# Patient Record
Sex: MALE | Race: WHITE | Employment: UNEMPLOYED | URBAN - METROPOLITAN AREA
[De-identification: names, ages, dates, MRNs, and addresses within clinical notes are randomized per-mention and may not be internally consistent; named-entity substitution may affect disease eponyms.]

---

## 2017-12-21 ENCOUNTER — TELEPHONE (OUTPATIENT)
Dept: ORTHOPEDIC SURGERY | Age: 52
End: 2017-12-21

## 2017-12-21 NOTE — TELEPHONE ENCOUNTER
Referred by Dr. Demetri Regalado for cervical degenerative disc disease and neck pain. [facility]. Notes and report under Media. Pt has paramount advantage for insurance. Will you see?

## 2018-03-15 ENCOUNTER — OFFICE VISIT (OUTPATIENT)
Dept: ORTHOPEDIC SURGERY | Age: 53
End: 2018-03-15
Payer: MEDICARE

## 2018-03-15 VITALS — WEIGHT: 215 LBS | HEIGHT: 69 IN | BODY MASS INDEX: 31.84 KG/M2

## 2018-03-15 DIAGNOSIS — M48.02 SPINAL STENOSIS IN CERVICAL REGION: ICD-10-CM

## 2018-03-15 DIAGNOSIS — M54.2 NECK PAIN: Primary | ICD-10-CM

## 2018-03-15 DIAGNOSIS — M50.30 DDD (DEGENERATIVE DISC DISEASE), CERVICAL: ICD-10-CM

## 2018-03-15 PROCEDURE — 99203 OFFICE O/P NEW LOW 30 MIN: CPT | Performed by: ORTHOPAEDIC SURGERY

## 2018-03-15 PROCEDURE — G8419 CALC BMI OUT NRM PARAM NOF/U: HCPCS | Performed by: ORTHOPAEDIC SURGERY

## 2018-03-15 PROCEDURE — 3017F COLORECTAL CA SCREEN DOC REV: CPT | Performed by: ORTHOPAEDIC SURGERY

## 2018-03-15 PROCEDURE — 4004F PT TOBACCO SCREEN RCVD TLK: CPT | Performed by: ORTHOPAEDIC SURGERY

## 2018-03-15 PROCEDURE — G8427 DOCREV CUR MEDS BY ELIG CLIN: HCPCS | Performed by: ORTHOPAEDIC SURGERY

## 2018-03-15 PROCEDURE — G8484 FLU IMMUNIZE NO ADMIN: HCPCS | Performed by: ORTHOPAEDIC SURGERY

## 2018-03-15 RX ORDER — CYCLOBENZAPRINE HCL 10 MG
10 TABLET ORAL 3 TIMES DAILY PRN
Qty: 90 TABLET | Refills: 0 | Status: SHIPPED | OUTPATIENT
Start: 2018-03-15 | End: 2018-03-25

## 2018-03-15 NOTE — PROGRESS NOTES
Subjective:      Patient ID: Tyron Nunez is a 52 y.o. male. Neck Pain    This is a chronic problem. The current episode started more than 1 year ago. The problem occurs constantly. The problem has been unchanged. The pain is associated with nothing. The pain is present in the midline, left side and right side. The quality of the pain is described as aching. The pain is moderate. The symptoms are aggravated by twisting and position. The pain is same all the time. Stiffness is present all day. He has tried NSAIDs for the symptoms. The treatment provided mild relief. Review of Systems   Musculoskeletal: Positive for neck pain. All other systems reviewed and are negative. Objective:   Physical Exam   Constitutional: He is oriented to person, place, and time. He appears well-developed and well-nourished. HENT:   Head: Normocephalic and atraumatic. Eyes: Conjunctivae and EOM are normal.   Neck: Normal range of motion. Pulmonary/Chest: Effort normal. No respiratory distress. Neurological: He is alert and oriented to person, place, and time. He has normal strength. No sensory deficit. Normal gait   Skin: Skin is warm and dry. Psychiatric: His behavior is normal. Thought content normal.   Nursing note and vitals reviewed. Patient with diminished range of motion rotation and extension. Patient with aggravation pain with neck extension and rotation. MRI cervical spine is reviewed patient with a moderate cervical stenosis C5 6 and C6 7 with a mild anterolisthesis C4 5    Diagnostic x-rays cervical spine reveal a mild anterolisthesis C4 5 with significant cervical degenerative disc disease C5 6 and C6 7    Assessment:      Encounter Diagnoses   Name Primary?     Neck pain Yes    DDD (degenerative disc disease), cervical     Spinal stenosis in cervical region      Patient with moderate cervical stenosis C5 6 and C6 7 that is a likely pain generator    Nevertheless patient's pain is predominantly axial      Plan:      Trial cervical epidural steroid injections    Flexeril    Follow-up

## 2018-03-26 ENCOUNTER — HOSPITAL ENCOUNTER (OUTPATIENT)
Dept: PAIN MANAGEMENT | Age: 53
Discharge: HOME | End: 2018-03-26
Payer: MEDICARE

## 2018-03-26 VITALS
DIASTOLIC BLOOD PRESSURE: 95 MMHG | HEART RATE: 69 BPM | WEIGHT: 215 LBS | RESPIRATION RATE: 16 BRPM | HEIGHT: 69 IN | TEMPERATURE: 97.7 F | OXYGEN SATURATION: 97 % | BODY MASS INDEX: 31.84 KG/M2 | SYSTOLIC BLOOD PRESSURE: 146 MMHG

## 2018-03-26 PROCEDURE — 99241 PR OFFICE CONSULTATION NEW/ESTAB PATIENT 15 MIN: CPT | Performed by: ANESTHESIOLOGY

## 2018-03-26 PROCEDURE — 99203 OFFICE O/P NEW LOW 30 MIN: CPT

## 2018-03-26 RX ORDER — IBUPROFEN 800 MG/1
800 TABLET ORAL
COMMUNITY
Start: 2018-01-16

## 2018-03-26 RX ORDER — CYCLOBENZAPRINE HCL 10 MG
10 TABLET ORAL 3 TIMES DAILY PRN
COMMUNITY
End: 2021-04-13 | Stop reason: ALTCHOICE

## 2018-03-26 ASSESSMENT — PAIN SCALES - GENERAL: PAINLEVEL_OUTOF10: 6

## 2018-03-26 ASSESSMENT — PAIN DESCRIPTION - PROGRESSION: CLINICAL_PROGRESSION: GRADUALLY WORSENING

## 2018-03-26 ASSESSMENT — PAIN DESCRIPTION - ONSET: ONSET: ON-GOING

## 2018-03-26 ASSESSMENT — PAIN DESCRIPTION - FREQUENCY: FREQUENCY: CONTINUOUS

## 2018-03-26 ASSESSMENT — PAIN DESCRIPTION - ORIENTATION: ORIENTATION: RIGHT;LEFT

## 2018-03-26 ASSESSMENT — PAIN DESCRIPTION - LOCATION: LOCATION: NECK

## 2018-03-26 ASSESSMENT — PAIN DESCRIPTION - DESCRIPTORS: DESCRIPTORS: ACHING;TINGLING;SHARP

## 2018-03-26 ASSESSMENT — PAIN DESCRIPTION - PAIN TYPE: TYPE: CHRONIC PAIN

## 2018-03-27 ASSESSMENT — ENCOUNTER SYMPTOMS
SORE THROAT: 0
SHORTNESS OF BREATH: 0
BACK PAIN: 1

## 2018-03-27 NOTE — PROGRESS NOTES
[facility] Pain [facility]  Patient Pain Assessment  Consultation - No att. providers found    Primary Care Physician: Lisbeth Haddad MD    Chief complaint:   Chief Complaint   Patient presents with    Neck Pain   . HISTORY OF PRESENT ILLNESS:  Bran Jiang is 52 y.o. male with    Neck Pain    Associated symptoms include tingling. Pertinent negatives include no chest pain or fever. OARRS compliant? not applicable  Concern for prescription abuse?not applicable    Current Pain Assessment  Pain Assessment  Pain Assessment: 0-10  Pain Level: 6  Pain Type: Chronic pain  Pain Location: Neck  Pain Orientation: Right, Left  Pain Radiating Towards: neck to low back, occasional down left arm and back of head  Pain Descriptors: Aching, Tingling, Sharp  Pain Frequency: Continuous  Pain Onset: On-going  Clinical Progression: Gradually worsening  Effect of Pain on Daily Activities:  head movement increase pain, lying down   Patient's Stated Pain Goal:  (pain at 2 with head movement)  Pain Intervention(s): Medication (see eMar), Heat applied (hot shower)                    ADVERSE MEDICATION EFFECTS:   Constipation: not applicable  Bowel Regimen: Na  Diet: common adult  Appetite:  ok  Sedation:  not applicable  Urinary Retention: not applicable    FOCUSED PAIN SCALE:  Highest : 10  Lowest :6  Average: Range-6  When and What  was your last procedure:  na    Was your procedure effective:  not applicable    ACTIVITY/SOCIAL/EMOTIONAL:  Sleep Pattern: 5 hours per night.  generally restful sleep  Energy Level:  Normal  Currently attending Physical Therapy:  No  Home Exercises: never none  Mobility: no current mobility problem   Currently seeing a Psychiatrist or Psychologist:  No  Emotional Issues: moodiness   Mood: appropriate     ABERRANT BEHAVIORS SINCE LAST VISIT:  Have you ever been treated in another Pain Clinic no  Refills for prescriptions appropriate: not applicable  Lost rx/pills: not applicable  Taking more medication than prescribed:  not applicable  Are you receiving PAIN medications from  other doctors: no  Last Urine/Serum Drug Screen :  Was Serum/UDS as anticipated?  not applicable  Brought pill bottles in :not applicable   Was Pill count appropriate? :not applicable   Are currently pregnant? not applicable  Recent ER visits: No             Past Medical History      Diagnosis Date    Cancer ([facility] [address]     Diverticulosis of colon     GERD (gastroesophageal reflux disease)     Hypertension        Surgical History  Past Surgical History:   Procedure Laterality Date    COLONOSCOPY  12/19/14    hyperplastic polyp, diverticulosis     ENDOSCOPY, COLON, DIAGNOSTIC      SKIN CANCER EXCISION      melanoma    UPPER GASTROINTESTINAL ENDOSCOPY  12/19/14    mild esophagitis       Medications  Current Outpatient Prescriptions   Medication Sig Dispense Refill    ibuprofen (ADVIL;MOTRIN) 800 MG tablet Take 800 mg by mouth      cyclobenzaprine (FLEXERIL) 10 MG tablet Take 10 mg by mouth 3 times daily as needed for Muscle spasms      albuterol (PROVENTIL HFA) 108 (90 BASE) MCG/ACT inhaler Inhale 1-2 puffs into the lungs every 4 hours as needed for Wheezing or Shortness of Breath (Space out to every 6 hours as symptoms improve) Space out to every 6 hours as symptoms improve. 1 Inhaler 0    hydrochlorothiazide (MICROZIDE) 12.5 MG capsule Take 12.5 mg by mouth daily.  omeprazole (PRILOSEC) 40 MG capsule Take 40 mg by mouth daily. No current facility-administered medications for this encounter. Allergies  Patient has no known allergies. Family History  family history includes Diabetes in his father, maternal grandfather, maternal grandmother, and maternal uncle; High Blood Pressure in his mother.     Social History  Social History     Social History    Marital status: Single     Spouse name: N/A    Number of children: N/A    Years of education: N/A     Social History Main Topics   

## 2018-03-27 NOTE — CONSULTS
recent or acute  cardiopulmonary problems except he was in the hospital two months ago with  flu for five days. Negative for diabetes, negative for stroke, negative  for any other endocrine problems, negative for coronary artery disease,  negative for MI. PAST SURGICAL HISTORY:  Positive for excision of the skin cancer many years  ago. PERSONAL HISTORY:  The patient is . He is unemployed at the present  time. 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 He stopped smoking on 01/28/2018 when he was  hospitalized. He denies any alcohol abuse. Denies any illegal substance  abuse except he smokes marijuana occasionally to ease the pain. There is  no history of any weight gain or weight loss recently. No history of  depression. Appetite is good. Sleep is better once he started taking  Flexeril. FAMILY HISTORY:  Noncontributory. ALLERGIES AND MEDICATIONS:  Listed in Epic system. PHYSICAL EXAMINATION:  VITAL SIGNS:  Blood pressure is 146/95, respirations 16, and pulse is 69. He is 5 feet 9 inches tall and weighs about 215 pounds. GENERAL:  On physical examination which was done in the presence of his  wife, the patient is a 52-year-old gentleman, does not appear to be in any  acute distress. He was awake, alert, and oriented, communicative and is  cooperative. He has got a mild-to-moderate exogenous obesity. HEENT:  Head is normocephalic. Pupils are equal and reactive. No nasal  discharge. No discharge from the ears. Oral cavity within normal limits. NECK:  Fredirick Calk is midline. No carotid bruit. No lymph node enlargement. No  thyroid gland enlargement. MUSCULOSKELETAL:  C-spine range of motion is decreased by about 35%  anteroposteriorly and at least 40% from side to side movements and lateral  twisting more so on the right side. His shoulder range of motion is  bilaterally excellent.   His elbow range of motion is fairly good and equal.  Motor strength appears to be 5/5 on both sides. No evidence of any muscle  atrophy. Deep tendon reflexes are 2+ at the biceps, 2+ at the triceps, and  1+ at the brachioradialis and equal on both sides. There are no sensory  deficits for light touch, pinprick or cold sensation on each side. There  is no small muscle atrophy. He got external bilateral hand . There  are no skin changes. There is no hair loss. LUNGS:  Clear except a little bit decreased in the posterior bases. HEART:  S1 and S2 are heard. ABDOMEN:  Pendulous, soft, and nontender. EXTREMITIES:  Lower extremity examination is within normal limits. I reviewed his MRI of the cervical spine, which was done about 6 months ago  showing foraminal stenosis at C5-C6 and at C6-C7 along with the cervical  spine degenerative disk disease. PLAN:  At this point as Dr. Yazmin Gil suggested, we can give him a trial of  cervical epidural steroid injection at C5-C6 and C6-C7 to ease up his pain. The procedure was explained to the patient along with risks, benefits, and  complications using the skeletal model. He and his wife had many  questions, which were answered to their satisfaction. They agreed to  proceed and make an appointment in the near future. Dr. Yazmin Gil, thank you again for your kind referral.  If you have any  questions, please feel free to give us a call in the Pain Clinic.         Carmenza PEARCE    D: 03/26/2018 13:49:05       T: 03/26/2018 22:23:46     [email or web address]  Job#: [id number]     Doc#: [id number]    CC:  Manda Samuel

## 2018-04-02 ENCOUNTER — HOSPITAL ENCOUNTER (OUTPATIENT)
Dept: GENERAL RADIOLOGY | Age: 53
Discharge: HOME | End: 2018-04-04
Payer: MEDICARE

## 2018-04-02 ENCOUNTER — HOSPITAL ENCOUNTER (OUTPATIENT)
Dept: PAIN MANAGEMENT | Age: 53
Discharge: HOME | End: 2018-04-02
Payer: MEDICARE

## 2018-04-02 VITALS
SYSTOLIC BLOOD PRESSURE: 156 MMHG | RESPIRATION RATE: 16 BRPM | TEMPERATURE: 98 F | BODY MASS INDEX: 31.84 KG/M2 | WEIGHT: 215 LBS | HEART RATE: 77 BPM | HEIGHT: 69 IN | DIASTOLIC BLOOD PRESSURE: 100 MMHG | OXYGEN SATURATION: 98 %

## 2018-04-02 DIAGNOSIS — M48.02 CERVICAL STENOSIS OF SPINE: ICD-10-CM

## 2018-04-02 DIAGNOSIS — M54.12 CERVICAL RADICULOPATHY: ICD-10-CM

## 2018-04-02 DIAGNOSIS — M50.30 DDD (DEGENERATIVE DISC DISEASE), CERVICAL: ICD-10-CM

## 2018-04-02 DIAGNOSIS — M54.12 CERVICAL RADICULOPATHY: Primary | ICD-10-CM

## 2018-04-02 PROCEDURE — 62321 NJX INTERLAMINAR CRV/THRC: CPT | Performed by: PAIN MEDICINE

## 2018-04-02 PROCEDURE — 6360000002 HC RX W HCPCS: Performed by: PAIN MEDICINE

## 2018-04-02 PROCEDURE — 2580000003 HC RX 258: Performed by: PAIN MEDICINE

## 2018-04-02 PROCEDURE — 3209999900 FLUORO FOR SURGICAL PROCEDURES

## 2018-04-02 PROCEDURE — 6360000004 HC RX CONTRAST MEDICATION

## 2018-04-02 PROCEDURE — 6360000002 HC RX W HCPCS

## 2018-04-02 PROCEDURE — 62325 NJX INTERLAMINAR CRV/THRC: CPT

## 2018-04-02 RX ORDER — SODIUM CHLORIDE, SODIUM LACTATE, POTASSIUM CHLORIDE, CALCIUM CHLORIDE 600; 310; 30; 20 MG/100ML; MG/100ML; MG/100ML; MG/100ML
75 INJECTION, SOLUTION INTRAVENOUS CONTINUOUS
Status: DISCONTINUED | OUTPATIENT
Start: 2018-04-02 | End: 2018-04-03 | Stop reason: HOSPADM

## 2018-04-02 RX ORDER — MIDAZOLAM HYDROCHLORIDE 1 MG/ML
INJECTION INTRAMUSCULAR; INTRAVENOUS
Status: COMPLETED | OUTPATIENT
Start: 2018-04-02 | End: 2018-04-02

## 2018-04-02 RX ADMIN — MIDAZOLAM 2 MG: 1 INJECTION INTRAMUSCULAR; INTRAVENOUS at 09:16

## 2018-04-02 RX ADMIN — SODIUM CHLORIDE, POTASSIUM CHLORIDE, SODIUM LACTATE AND CALCIUM CHLORIDE 75 ML/HR: 600; 310; 30; 20 INJECTION, SOLUTION INTRAVENOUS at 08:34

## 2018-04-02 ASSESSMENT — PAIN SCALES - GENERAL
PAINLEVEL_OUTOF10: 2
PAINLEVEL_OUTOF10: 8

## 2018-04-02 ASSESSMENT — PAIN DESCRIPTION - ORIENTATION: ORIENTATION: RIGHT;LEFT

## 2018-04-02 ASSESSMENT — PAIN DESCRIPTION - ONSET: ONSET: ON-GOING

## 2018-04-02 ASSESSMENT — PAIN DESCRIPTION - PROGRESSION: CLINICAL_PROGRESSION: GRADUALLY WORSENING

## 2018-04-02 ASSESSMENT — PAIN DESCRIPTION - PAIN TYPE: TYPE: CHRONIC PAIN

## 2018-04-02 ASSESSMENT — PAIN DESCRIPTION - LOCATION: LOCATION: ARM;SHOULDER;NECK

## 2018-04-02 ASSESSMENT — PAIN - FUNCTIONAL ASSESSMENT: PAIN_FUNCTIONAL_ASSESSMENT: 0-10

## 2018-04-03 ENCOUNTER — TELEPHONE (OUTPATIENT)
Dept: PAIN MANAGEMENT | Age: 53
End: 2018-04-03

## 2018-04-23 ENCOUNTER — HOSPITAL ENCOUNTER (OUTPATIENT)
Dept: PAIN MANAGEMENT | Age: 53
Discharge: HOME | End: 2018-04-23
Payer: MEDICARE

## 2018-04-23 VITALS
DIASTOLIC BLOOD PRESSURE: 101 MMHG | TEMPERATURE: 97.6 F | SYSTOLIC BLOOD PRESSURE: 140 MMHG | OXYGEN SATURATION: 96 % | HEIGHT: 69 IN | HEART RATE: 61 BPM | RESPIRATION RATE: 16 BRPM | BODY MASS INDEX: 31.84 KG/M2 | WEIGHT: 215 LBS

## 2018-04-23 DIAGNOSIS — M50.30 DDD (DEGENERATIVE DISC DISEASE), CERVICAL: ICD-10-CM

## 2018-04-23 DIAGNOSIS — M48.02 CERVICAL STENOSIS OF SPINE: ICD-10-CM

## 2018-04-23 DIAGNOSIS — M54.12 CERVICAL RADICULOPATHY: Primary | ICD-10-CM

## 2018-04-23 PROCEDURE — 99213 OFFICE O/P EST LOW 20 MIN: CPT

## 2018-04-23 PROCEDURE — 99213 OFFICE O/P EST LOW 20 MIN: CPT | Performed by: PAIN MEDICINE

## 2018-04-23 ASSESSMENT — ENCOUNTER SYMPTOMS
SHORTNESS OF BREATH: 0
VOMITING: 0
HEARTBURN: 0
CONSTIPATION: 0
NAUSEA: 0
ORTHOPNEA: 0
GASTROINTESTINAL NEGATIVE: 1
BLURRED VISION: 0
COUGH: 0
PHOTOPHOBIA: 0
SORE THROAT: 0

## 2018-04-23 ASSESSMENT — PAIN SCALES - GENERAL: PAINLEVEL_OUTOF10: 3

## 2018-04-23 ASSESSMENT — PAIN DESCRIPTION - DESCRIPTORS: DESCRIPTORS: SPASM

## 2018-04-23 ASSESSMENT — PAIN DESCRIPTION - LOCATION: LOCATION: NECK

## 2018-04-23 ASSESSMENT — PAIN DESCRIPTION - PAIN TYPE: TYPE: CHRONIC PAIN

## 2018-04-23 ASSESSMENT — PAIN DESCRIPTION - ONSET: ONSET: ON-GOING

## 2018-04-23 ASSESSMENT — PAIN DESCRIPTION - FREQUENCY: FREQUENCY: CONTINUOUS

## 2018-04-23 ASSESSMENT — PAIN DESCRIPTION - ORIENTATION: ORIENTATION: RIGHT

## 2018-04-23 ASSESSMENT — PAIN DESCRIPTION - PROGRESSION: CLINICAL_PROGRESSION: NOT CHANGED

## 2018-04-23 ASSESSMENT — ACTIVITIES OF DAILY LIVING (ADL): EFFECT OF PAIN ON DAILY ACTIVITIES: PAIN INCREASES WITH HEAD MOVEMENT

## 2021-04-13 ENCOUNTER — TELEPHONE (OUTPATIENT)
Dept: UROLOGY | Age: 56
End: 2021-04-13

## 2021-04-13 ENCOUNTER — OFFICE VISIT (OUTPATIENT)
Dept: UROLOGY | Age: 56
End: 2021-04-13
Payer: MEDICARE

## 2021-04-13 VITALS — DIASTOLIC BLOOD PRESSURE: 81 MMHG | TEMPERATURE: 97.6 F | HEART RATE: 74 BPM | SYSTOLIC BLOOD PRESSURE: 138 MMHG

## 2021-04-13 DIAGNOSIS — R97.20 ELEVATED PSA: Primary | ICD-10-CM

## 2021-04-13 PROCEDURE — G8421 BMI NOT CALCULATED: HCPCS | Performed by: UROLOGY

## 2021-04-13 PROCEDURE — 99203 OFFICE O/P NEW LOW 30 MIN: CPT | Performed by: UROLOGY

## 2021-04-13 PROCEDURE — 3017F COLORECTAL CA SCREEN DOC REV: CPT | Performed by: UROLOGY

## 2021-04-13 PROCEDURE — 1036F TOBACCO NON-USER: CPT | Performed by: UROLOGY

## 2021-04-13 PROCEDURE — G8427 DOCREV CUR MEDS BY ELIG CLIN: HCPCS | Performed by: UROLOGY

## 2021-04-13 ASSESSMENT — ENCOUNTER SYMPTOMS
WHEEZING: 0
BACK PAIN: 0
EYE REDNESS: 0
VOMITING: 0
COUGH: 0
DIARRHEA: 0
ABDOMINAL PAIN: 0
NAUSEA: 0
CONSTIPATION: 0
EYE PAIN: 0
SHORTNESS OF BREATH: 0

## 2021-04-13 NOTE — TELEPHONE ENCOUNTER
Watch out for repeat PSA done at [facility]. If still elevated, will need on the books for a prostate biopsy per Dr. Dejesus Cons.

## 2021-04-13 NOTE — PROGRESS NOTES
Review of Systems   Constitutional: Negative for appetite change, chills and fever. Eyes: Negative for pain, redness and visual disturbance. Respiratory: Negative for cough, shortness of breath and wheezing. Cardiovascular: Negative for chest pain and leg swelling. Gastrointestinal: Negative for abdominal pain, constipation, diarrhea, nausea and vomiting. Genitourinary: Positive for frequency (every so often). Negative for difficulty urinating, dysuria, flank pain, hematuria and urgency. Musculoskeletal: Negative for back pain, joint swelling and myalgias. Skin: Negative for rash and wound. Neurological: Negative for dizziness, tremors and numbness. Hematological: Does not bruise/bleed easily.

## 2021-04-13 NOTE — PROGRESS NOTES
[address] [address]  Dept: [phone]  Dept Fax: [phone] [facility] Urology Office Note - New patient    Patient:  Gregory Rodriguez  YOB: 1965  Date: 4/13/2021    The patient is a 60 y.o. male who presents todayfor evaluation of the following problems:   Chief Complaint   Patient presents with    New Patient    Elevated PSA    referred by Aaron Cadena MD.      HPI  He is here for elevated PSA. His most recent PSA is 5.42. He has not had a previous PSA. No voiding complaints at that time. No FHx of prostate cancer. No history of prostate cancer. (Patient's old records have been requested, reviewed and summarized in today's note.)    Summary of old records: N/A    Additional History: N/A    Procedures Today: N/A    Last several PSA's:  No results found for: PSA  Last total testosterone:  No results found for: TESTOSTERONE  Urinalysis today:  No results found for this visit on 04/13/21. AUA Symptom Score (4/13/2021):   INCOMPLETE EMPTYING: How often have you had the sensation of not emptying your bladder?: Not at all  FREQUENCY: How often do you have to urinate less than every two hours?: Less than Half the time  INTERMITTENCY: How often have you found you stopped and started again several times when you urinated?: Not at all  URGENCY: How often have you found it difficult to postpone urination?: Not at all  WEAK STREAM: How often have you had a weak urinary stream?: Not at all  STRAINING: How often have you had to strain to start  urination?: Not at all  NOCTURIA: How many times did you typically get up at night to uriniate?: 2 Times  TOTAL I-PSS SCORE[de-identified] 4  How would you feel if you were to spend the rest of your life with your urinary condition?: Mostly Satisfied    Last BUN and creatinine:  Lab Results   Component Value Date    BUN 16 06/30/2015     Lab Results   Component Value Date CREATININE 1.10 06/30/2015       Additional Lab/Culture results: none    Imaging Reviewed during this Office Visit: none  (results were independently reviewed by physician and radiology report verified)    PAST MEDICAL, FAMILY AND SOCIAL HISTORY:  Past Medical History:   Diagnosis Date    Cancer ([address]     Diverticulosis of colon     GERD (gastroesophageal reflux disease)     Hypertension      Past Surgical History:   Procedure Laterality Date    COLONOSCOPY  12/19/14    hyperplastic polyp, diverticulosis     ENDOSCOPY, COLON, DIAGNOSTIC      SKIN CANCER EXCISION      melanoma    UPPER GASTROINTESTINAL ENDOSCOPY  12/19/14    mild esophagitis     Family History   Problem Relation Age of Onset    High Blood Pressure Mother     Diabetes Father     Diabetes Maternal Uncle     Diabetes Maternal Grandmother     Diabetes Maternal Grandfather      Outpatient Medications Marked as Taking for the 4/13/21 encounter (Office Visit) with Araceli Alvarez MD   Medication Sig Dispense Refill    LISINOPRIL PO Take by mouth daily      ibuprofen (ADVIL;MOTRIN) 800 MG tablet Take 800 mg by mouth      albuterol (PROVENTIL HFA) 108 (90 BASE) MCG/ACT inhaler Inhale 1-2 puffs into the lungs every 4 hours as needed for Wheezing or Shortness of Breath (Space out to every 6 hours as symptoms improve) Space out to every 6 hours as symptoms improve. 1 Inhaler 0    hydrochlorothiazide (MICROZIDE) 12.5 MG capsule Take 12.5 mg by mouth daily.  omeprazole (PRILOSEC) 40 MG capsule Take 40 mg by mouth daily. Patient has no known allergies.   Social History     Tobacco Use   Smoking Status Former Smoker    Packs/day: 0.00    Years: 30.00    Pack years: 0.00    Quit date: 1/28/2018    Years since quitting: 3.2   Smokeless Tobacco Never Used      (If patient a smoker, smoking cessation counseling offered)   Social History     Substance and Sexual Activity   Alcohol Use No       REVIEW OF SYSTEMS:  Review of Systems Physical Exam:    This a 60 y.o. male   Vitals:    04/13/21 1350   BP: 138/81   Pulse: 74   Temp: 97.6 °F (36.4 °C)     There is no height or weight on file to calculate BMI. Physical Exam  Constitutional: Patient in no acute distress. Neuro: Alert and oriented to person, place and time. Psych: Mood normal, affect normal  Lungs:Respiratory effort is normal  Cardiovascular: Warm & Pink  Abdomen: Soft, non-tender, non-distendedwith no CVA,  No flank tenderness,  Orhepatosplenomegaly   Lymphatics: No palpable lymphadenopathy. Bladder non-tender and not distended. Musculoskeletal: Normal gait and station  Penis normal and circumcised  Urethral meatus normal  Scrotal exam normal  Testicles normal bilaterally  Epididymis normal bilaterally  No evidence of inguinal hernia  Normal rectal tone with no masses  Prostate:  30 grams, smooth, no nodules. Assessment and Plan      1. Elevated PSA           Plan:        Has elevated PSA. Has never had a previous PSA. Will obtain repeat PSA. If remains elevated, would recommend a biopsy. Prescriptions Ordered:  No orders of the defined types were placed in this encounter. Orders Placed:  Orders Placed This Encounter   Procedures    PSA, Diagnostic     Standing Status:   Future     Standing Expiration Date:   4/13/2022             Bola Freeman MD    Agree with the ROS entered by the MA.

## 2021-04-13 NOTE — LETTER
[address] [address]  Dept: [phone]  Dept Fax: [phone]        4/13/21    Patient: Anibal Youssef  YOB: 1965    Dear Rojelio Dinh MD,    I had the pleasure of seeing one of your patients, Sylvester Etienne today in the office today. Below are the relevant portions of my assessment and plan of care. IMPRESSION:  1. Elevated PSA        PLAN:  Has elevated PSA. Has never had a previous PSA. Will obtain repeat PSA. If remains elevated, would recommend a biopsy. Prescriptions Ordered:  No orders of the defined types were placed in this encounter. Orders Placed:  Orders Placed This Encounter   Procedures    PSA, Diagnostic     Standing Status:   Future     Standing Expiration Date:   4/13/2022         Thank you for allowing me to participate in the care of this patient. I will keep you updated on this patient's follow up and I look forward to serving you and your patients again in the future.         Marian Thao MD

## 2021-04-21 NOTE — TELEPHONE ENCOUNTER
Please discussed with Dr. Alva Bright       Lab Results  - documented in this encounter    Prostatic specific antigen screen (04/19/2021 10:05 AM EDT)  Prostatic specific antigen screen (04/19/2021 10:05 AM EDT)   Component Value Ref Range Performed At Pathologist Signature   Prostatic specific antigen, screen 5.17 (H) 0.00 - 4.00 ng/mL [facility]       Prostatic specific antigen screen (04/19/2021 10:05 AM EDT)   Specimen   Serum     Prostatic specific antigen screen (04/19/2021 10:05 AM EDT)   Performing Organization Address City/State/ZIP Code Phone Number   [email or web address]       [facility]   [address] [facility] [address] [facility] [address]

## 2021-05-06 ENCOUNTER — OFFICE VISIT (OUTPATIENT)
Dept: PODIATRY | Age: 56
End: 2021-05-06
Payer: COMMERCIAL

## 2021-05-06 VITALS — HEIGHT: 69 IN | WEIGHT: 217 LBS | BODY MASS INDEX: 32.14 KG/M2

## 2021-05-06 DIAGNOSIS — M21.861 GASTROCNEMIUS EQUINUS OF RIGHT LOWER EXTREMITY: ICD-10-CM

## 2021-05-06 DIAGNOSIS — R60.0 EDEMA OF LOWER EXTREMITY: ICD-10-CM

## 2021-05-06 DIAGNOSIS — M76.61 ACHILLES TENDINITIS OF RIGHT LOWER EXTREMITY: ICD-10-CM

## 2021-05-06 DIAGNOSIS — M89.8X7 RETROCALCANEAL EXOSTOSIS: Primary | ICD-10-CM

## 2021-05-06 DIAGNOSIS — M77.51 CALCANEAL BURSITIS (HEEL), RIGHT: ICD-10-CM

## 2021-05-06 DIAGNOSIS — M79.671 PAIN IN RIGHT FOOT: ICD-10-CM

## 2021-05-06 PROCEDURE — 1036F TOBACCO NON-USER: CPT | Performed by: PODIATRIST

## 2021-05-06 PROCEDURE — G8427 DOCREV CUR MEDS BY ELIG CLIN: HCPCS | Performed by: PODIATRIST

## 2021-05-06 PROCEDURE — 99203 OFFICE O/P NEW LOW 30 MIN: CPT | Performed by: PODIATRIST

## 2021-05-06 PROCEDURE — 3017F COLORECTAL CA SCREEN DOC REV: CPT | Performed by: PODIATRIST

## 2021-05-06 PROCEDURE — G8417 CALC BMI ABV UP PARAM F/U: HCPCS | Performed by: PODIATRIST

## 2021-05-06 RX ORDER — AMITRIPTYLINE HYDROCHLORIDE 10 MG/1
10 TABLET, FILM COATED ORAL NIGHTLY
COMMUNITY
Start: 2021-04-14

## 2021-05-06 RX ORDER — MELOXICAM 7.5 MG/1
7.5 TABLET ORAL DAILY PRN
COMMUNITY
Start: 2021-04-14 | End: 2021-06-17 | Stop reason: ALTCHOICE

## 2021-05-06 ASSESSMENT — ENCOUNTER SYMPTOMS
DIARRHEA: 0
BACK PAIN: 1
COLOR CHANGE: 0
NAUSEA: 0
SHORTNESS OF BREATH: 0

## 2021-05-06 NOTE — PROGRESS NOTES
12/19/14    hyperplastic polyp, diverticulosis     ENDOSCOPY, COLON, DIAGNOSTIC      SKIN CANCER EXCISION      melanoma    UPPER GASTROINTESTINAL ENDOSCOPY  12/19/14    mild esophagitis       Family History   Problem Relation Age of Onset    High Blood Pressure Mother     Diabetes Father     Diabetes Maternal Uncle     Diabetes Maternal Grandmother     Diabetes Maternal Grandfather        Social History     Tobacco Use    Smoking status: Former Smoker     Packs/day: 0.00     Years: 30.00     Pack years: 0.00     Quit date: 1/28/2018     Years since quitting: 3.2    Smokeless tobacco: Never Used   Substance Use Topics    Alcohol use: No       Review of Systems   Constitutional: Negative for activity change, appetite change, chills, diaphoresis, fatigue and fever. Respiratory: Negative for shortness of breath. Cardiovascular: Negative for leg swelling. Gastrointestinal: Negative for diarrhea and nausea. Endocrine: Negative for cold intolerance, heat intolerance and polyuria. Musculoskeletal: Positive for arthralgias and back pain. Negative for gait problem, joint swelling and myalgias. Skin: Negative for color change, pallor, rash and wound. Allergic/Immunologic: Negative for environmental allergies and food allergies. Neurological: Negative for dizziness, weakness, light-headedness and numbness. Hematological: Does not bruise/bleed easily. Psychiatric/Behavioral: Negative for behavioral problems, confusion and self-injury. The patient is not nervous/anxious. Vitals: There were no vitals filed for this visit. General: AAO x 3 in NAD. Integument: There are no rashes, ulcers, or breaks in the skin noted to the bilateral lower extremities. There is no induration, subcutaneous nodules, or tightening of the skin noted to the bilateral.     Toenails 1-5 of the right foot do not present with thickness, elongation, discoloration, brittleness, subungual debris.      Toenails 1-5 of the left foot do not present with thickness, elongation, discoloration, brittleness, subungual debris. Interdigital maceration absent to web spaces 1-4, Bilateral.     There are no preulcerative lesions noted to the right foot. There are no preulcerative lesions noted to the left foot. The skin to the bilateral feet is not thin and shiny. The skin to the bilateral feet is  warm, supple, and dry. Vascular: DP pulse of the right foot is  palpable. DP pulse of the left foot is  palpable. PT pulse of the right foot is  palpable. PT pulse of the left foot is  palpable. CFT is less than 3 secs to the digits of the right foot. CFT is less than 3 secs to the digits of the left foot. There is no edema noted to the bilateral foot or ankle. There is hair growth noted to the digits of the bilateral feet. There are no varicosities noted to the right foot/ankle. There are no varicosities noted to the left foot/ankle. Erythema is absent to the bilateral feet. Neurological: Reflexes are present to the right plantar foot and to the Achilles tendon. Reflexes are present to the left plantar foot and to the Achilles tendon. Epicritic sensation is  intact to the right foot. Epicritic sensation is  intact to the left foot. Protective sensation is present to the right foot along the first and second rays as noted with a 5.07 False Pass-Meal monofilament. Musculoskeletal:  Muscle strength is +5/5 to all four muscle groups of the right lower extremity and +5/5 to all four muscle groups of the left lower extremity. There are no areas of subluxation, dislocation, or laxity noted to either lower extremity. Range of motion to the right ankle is  free of pain or grinding. Range of motion to the left ankle is  free of pain or grinding. Range of motion to the right subtalar joint is  free of pain or grinding.      Range of motion to the left subtalar joint is  free of pain or grinding. There is soft tissue resistance upon passive dorsiflexion of the bilateral ankles with the knee(s) extended. However, this soft tissue resistance is not present with the knee(s) flexed. This finding is greater on the right. There is pain with palpation to the posterior aspect of the right heel at the insertion of the Achilles tendon. There is no pain with palpation to the watershed region of the Achilles tendon. No abnormalities, asymmetries, or misalignments are seen between the extremities. Weightbearing evaluation does reveal rearfoot eversion, medial prominence of the talar head, loss of the medial longitudinal arch height, and too many toes sign bilaterally. The lesser digits of the right foot are not contracted. The lesser digits of the left foot are not contracted. There is no prominence noted to the first metatarsal head without abduction of the hallux of the right foot. There is no prominence noted to the first metatarsal head without abduction of the hallux of the left foot. Shoe examination was performed. Biomechanical Exam: normal bilaterally. X-ray's taken: AP, Lateral, Lateral Oblique, and Medial Oblique of the right foot. Findings: There is no fracture or stress fracture noted. There is a small calcification noted to the posterior aspect of the calcaneus that appears to be within the Achilles tendon. Asessment: Patient is a 60 y.o. male with:    Diagnosis Orders   1. Retrocalcaneal exostosis  XR FOOT RIGHT (MIN 3 VIEWS)    Ambulatory referral to Physical Therapy   2. Calcaneal bursitis (heel), right  XR FOOT RIGHT (MIN 3 VIEWS)    Ambulatory referral to Physical Therapy   3. Achilles tendinitis of right lower extremity  XR FOOT RIGHT (MIN 3 VIEWS)    Ambulatory referral to Physical Therapy   4. Gastrocnemius equinus of right lower extremity  XR FOOT RIGHT (MIN 3 VIEWS)    Ambulatory referral to Physical Therapy   5. Edema of lower extremity  XR FOOT RIGHT (MIN 3 VIEWS)    Ambulatory referral to Physical Therapy   6. Pain in right foot  XR FOOT RIGHT (MIN 3 VIEWS)    Ambulatory referral to Physical Therapy       Plan:  1. Clinical evaluation of the patient. 2. Patient given an order for physical therapy for treatment of his equinus and tendonitis. Patient informed that I find nothing wrong with his foot that relates to the tingling and burning he is experiencing. Patient informed that I believe this is coming from his back and he is advised to see his back specialist for this. 3. Contact office with any questions/problems/concerns. Return in about 6 weeks (around 6/17/2021) for evaluation of achilles right.    5/6/2021      Radha Obrien DPM

## 2021-05-17 ENCOUNTER — HOSPITAL ENCOUNTER (OUTPATIENT)
Dept: PHYSICAL THERAPY | Age: 56
Setting detail: THERAPIES SERIES
Discharge: HOME | End: 2021-05-17
Payer: COMMERCIAL

## 2021-05-17 PROCEDURE — 97162 PT EVAL MOD COMPLEX 30 MIN: CPT

## 2021-05-17 PROCEDURE — 97110 THERAPEUTIC EXERCISES: CPT

## 2021-05-17 NOTE — PROGRESS NOTES
at time of initial PT eval [] MRI:  [] Other:     Medications: [] Refer to full medical record [] None [] Other:  Allergies:      [] Refer to full medical record [] None [] Other:    Function:  Hand Dominance  [] Right  [] Left  Working:  [] Normal Duty  [] Light Duty  [] Off D/T Condition  [] Retired    [] Not Employed    []  Disability  [] Other:           Return to work:   Job/ADL Description: odd jobs- self employed, works on farm and other physical tasks    Patient previously (I) with all functional activity, previously (I) with all functional activity with exceptions listed below.   ADL/IADL Previous level of function Current level of function Who currently assists the patient with task   Bathing  [] Independent  [] Assist [] Independent  [] Assist    Dress/grooming [] Independent  [] Assist [] Independent  [] Assist    Transfer/mobility [] Independent  [] Assist [] Independent  [] Assist    Feeding [] Independent  [] Assist [] Independent  [] Assist    Toileting [] Independent  [] Assist [] Independent  [] Assist    Driving [] Independent  [] Assist [] Independent  [] Assist    Housekeeping [] Independent  [] Assist [x] Independent  [] Assist Difficulty prolonged walking   Grocery shop/meal prep [] Independent  [] Assist [x] Independent  [] Assist Difficulty proglonged walking     Gait Prior level of function Current level of function    [] Independent  [] Assist [x] Independent  [] Assist   Device: [] Independent [] Independent    [] Straight Cane [] Quad cane [] Straight Cane [] Quad cane    [] Standard walker [] Rolling walker   [] 4 wheeled walker [] Standard walker [] Rolling walker   [] 4 wheeled walker    [] Wheelchair [] Wheelchair     Pain:  [x] Yes  [] No Location: (R) posterior calcaneus at juncture of Achilles and calcaneus, worse medially versus laterally Pain Rating: (0-10 scale) 7/10  Pain altered Tx:  [] Yes  [] No  Action:  Symptoms:  [] Improving [] Worsening [] Same  Better:  [] AM    [] PM [] Sit    [] Rise/Sit    []Stand    [] Walk    [] Lying    [] Other:  Worse: [] AM    [] PM    [] Sit    [] Rise/Sit    []Stand    [] Walk    [] Lying    [] Bend                             [] Valsalva    [] Other:  Sleep: [] OK    [] Disturbed    Objective:  AROM:  DF (R) 8 degrees, (L) 10 degrees end range discomfort (R) Achilles  PF 45 degrees no pain (R), (L) 55 degrees  Inversion 35 degrees (B) with clunk with AROM  Eversion 20 degrees (B)    Able to do (B) heel raise with minimal discomfort, SL heel raise to ~50% of uninvolved side with discomfort in Achilles. OBSERVATION No Deficit Deficit Not Tested Comments   Posture       Forward Head [] [] []    Rounded Shoulders [] [] []    Kyphosis [] [] []    Lordosis [] [] []    Lateral Shift [] [] []    Scoliosis [] [] []    Iliac Crest [] [] []    PSIS [] [] []    ASIS [] [] []    Genu Valgus [] [] []    Genu Varus [] [] []    Genu Recurvatum [] [] []    Pronation [] [] []    Supination [] [] []    Leg Length Discrp [] [] []    Slumped Sitting [] [] []    Palpation [] [] [] MOD TTP (R) distal Achilles, proximal calcaneus   Sensation [] [] []    Edema [] [] []    Neurological [] [] []    Patellar Mobility [] [] []    Patellar Orientation [] [] []    Gait [] [] [] Analysis:       Comments:    Assessment:  Patient presents with limited ankle mobility at end range, pain and limitation with full strength testing, limitation with full ambulating first thing in the AM, after sitting in car, and with use for full day. Symptoms are consistent with DX of heel spur and Achilles tendonitis/tendonosis. [] Patient would continue to benefit from skilled physical therapy services in order to: improve/normalize ROM and improve/normalize function. Problems:    [] ? Pain:  Patient pain 7/10 at worst     [] ? ROM:  Limited with end range DF ROM    [] ? Strength:  Limited with SL heel raise    [] ?  Function:   Limited with functional tolerance of WB after sitting, first WB in the AM, with prolonged use throughout the day. STG/LTG: (to be met in 6-8 treatments)  1. ? Pain: normalized pain  2. ? ROM: normalized DF- 10 degrees without pain  3. ? Strength:  Able to do (B) heel raise, able to do SL heel raise without pain  4. ? Function:  Return to WB after sitting and in the AM with 80% or greater improvement of symptoms. 5. Independent with Home Exercise Programs  Patient goals: to be able to walk better    Functional Assessment Used: optimal 50% limited, 9/3  Current Status: Score  Goal Status: Score    Evaluation Complexity:  History (Personal factors, comorbidities) [] 0 [x] 1-2 [] 3+   Exam (limitations, restrictions) [] 1-2 [x] 3 [] 4+   Clinical presentation (progression) [] Stable [x] Evolving  [] Unstable   Decision Making [] Low [x] Moderate [] High    [] Low Complexity [x] Moderate Complexity [] High Complexity       Rehab Potential:  [] Good  [x] Fair  [] Poor   Suggested Professional Referral:  [x] No  [] Yes:  Barriers to Goal Achievement[de-identified]  [x] No  [] Yes:  Domestic Concerns:  [x] No  [] Yes:    Pt. Education:  [x] Plans/Goals, Risks/Benefits discussed  [x] Home exercise program    Method of Education: [x] Verbal  [] Demo  [x] Written  Comprehension of Education:  [] Verbalizes understanding. [] Demonstrates understanding. [] Needs Review. [] Demonstrates/verbalizes understanding of HEP/Ed previously given.     Treatment Plan:  [x] Therapeutic Exercise   52490  [] Iontophoresis: 4 mg/mL Dexamethasone Sodium Phosphate  mAmin  76534   [] Therapeutic Activity  21089 [] Vasopneumatic cold with compression  34616    [] Gait Training   26331 [] Ultrasound   57589   [x] Neuromuscular Re-education  14634 [] Electrical Stimulation Unattended  92778   [x] Manual Therapy  92186 [] Electrical Stimulation Attended  71554   [x] Instruction in HEP  [] Lumbar/Cervical Traction  89204   [] Aquatic Therapy   53564 [] Cold/hotpack    [] Massage   71106      [] Dry Needling, 1 or 2

## 2021-05-19 ENCOUNTER — HOSPITAL ENCOUNTER (OUTPATIENT)
Dept: PHYSICAL THERAPY | Age: 56
Setting detail: THERAPIES SERIES
Discharge: HOME | End: 2021-05-19
Payer: COMMERCIAL

## 2021-05-19 PROCEDURE — 97110 THERAPEUTIC EXERCISES: CPT

## 2021-05-19 NOTE — FLOWSHEET NOTE
[facility]   [address] [facility] [address]   Phone: [phone]   Fax: [phone]    Physical Therapy Daily Treatment Note    Date:  2021  Patient Name:  Orville Mo    :  1965  MRN: [id number]  Physician: Eric Francois DPM                 Insurance: [employer] 30 visits per year  Medical Diagnosis: retrocalcaneal exotosis M89.8X7, (R) heel bursitis M77.51               Rehab Codes: ankle pain M25.571  Onset date: referral 21                     Next Dr's appt.: PRN  Visit Count:                            Cancel/No Show: 0/0     Subjective:  Pt reports feeling good this morning with 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 States less pain noted after stretches and exercises given for at home. Pain:  [x] Yes  [] No Location: Right ankle/heel Pain Rating: (0-10 scale) 3/10  Pain altered Tx:  [x] No  [] Yes  Action:  Comments:    Objective:  Modalities:   Precautions:  Exercises:  Exercise Reps/ Time Weight/ Level Comments Preformed today   Supine Achilles stretch 3 x 20\"    x   Stair stretch 3 x 20\"    x   4-way ankle Tband x10 reps Red R only x   Heel raises 10x  eccentric x   2 up 1 down 10x   x   SLS blue foam 3x30\"  R only x   BAPS board RLE 10x  L2  PF/DF; M/L; Cw/ccw x   Step downs Forward 10x R on step  x                                Other:    Specific Instructions for next treatment:  Progress standing exercises when able and continue POC    Assessment: [x] Progressing toward goals. Tolerated all exercises well with minimal to no pain. States \"feeling a stretch\" with multiple exercises but denies pain. [] No change.      [] Other:    [] Patient would continue to benefit from skilled physical therapy services in order to:     STG/LTG: (to be met in 6-8 treatments)  1. ? Pain: normalized pain  2. ? ROM: normalized DF- 10 degrees without pain  3. ? Strength:  Able to do (B) heel raise, able to do SL heel raise without pain  4. ? Function:  Return to WB after sitting and in the AM with 80% or greater improvement of symptoms. 5. Independent with Home Exercise Programs  Patient goals: to be able to walk better    Pt. Education:  [x] Yes  [] No  [x] Reviewed Prior HEP/Ed  Method of Education: [x] Verbal  [x] Demo  [] Written  Comprehension of Education:  [x] Verbalizes understanding. [x] Demonstrates understanding. [] Needs review. [] Demonstrates/verbalizes HEP/Ed previously given. Plan: [x] Continue per plan of care.    [] Other:      Treatment Charges: Mins Units   []  Modalities     [x]  Ther Exercise 40 3   []  Manual Therapy     []  Ther Activities     []  Aquatics     []  Neuromuscular     [] Vasocompression     [] Gait Training     [] Dry needling        [] 1 or 2 muscles        [] 3 or more muscles     []  Other     Total Treatment time 40 3     Time In: 3873            Time Out: 8038    Electronically signed by:  Radha Martinez PTA

## 2021-05-24 ENCOUNTER — HOSPITAL ENCOUNTER (OUTPATIENT)
Dept: PHYSICAL THERAPY | Age: 56
Setting detail: THERAPIES SERIES
Discharge: HOME | End: 2021-05-24
Payer: COMMERCIAL

## 2021-05-24 PROCEDURE — 97110 THERAPEUTIC EXERCISES: CPT

## 2021-05-24 NOTE — FLOWSHEET NOTE
[facility]   [address] [facility] [address]   Phone: [phone]   Fax: [phone]    Physical Therapy Daily Treatment Note    Date:  2021  Patient Name:  Derick Segura    :  1965  MRN: [id number]  Physician: Cayetano Liu DPROLANDA                 Insurance: [facility] 30 visits per year  Medical Diagnosis: retrocalcaneal exotosis M89.8X7, (R) heel bursitis M77.51               Rehab Codes: ankle pain M25.571  Onset date: referral 21                     Next Dr's appt.: PRN  Visit Count: 3/8                           Cancel/No Show: 0/0     Subjective:  Pt reports right heel has been feeling better since starting therapy. States has not been limping around when he on he feet all day. Pain:  [x] Yes  [] No Location: Right ankle/heel Pain Rating: (0-10 scale) 4/10  Pain altered Tx:  [x] No  [] Yes  Action:  Comments:    Objective:  Modalities:   Precautions:  Exercises:  Exercise Reps/ Time Weight/ Level Comments Preformed today   Supine Achilles stretch 3 x 30\"    x   Stair stretch 3 x 20\"    x   4-way ankle Tband x10 reps Red R only x   Heel raises 20x  eccentric x   2 up 1 down 10x   x   SLS blue foam 3x30\"  R only x   BAPS board RLE 10x  L2  PF/DF; M/L; Cw/ccw x   Step downs Forward 10x2 R on step  x   Total gym heel raises       Half sphere                      Other:    Specific Instructions for next treatment:  Progress standing exercises when able and continue POC    Assessment: [x] Progressing toward goals. Tolerated exercises well with progressing reps and exercises shown above. Notes fatigue with SL exercises     [] No change.      [] Other:    [] Patient would continue to benefit from skilled physical therapy services in order to:     STG/LTG: (to be met in 6-8 treatments)  1. ? Pain: normalized pain  2. ? ROM: normalized DF- 10 degrees without pain  3. ? Strength:  Able to do (B) heel raise, able to do SL heel raise without pain  4. ? Function:  Return to WB after sitting and in the AM with 80% or greater improvement of symptoms. 5. Independent with Home Exercise Programs  Patient goals: to be able to walk better    Pt. Education:  [x] Yes  [] No  [x] Reviewed Prior HEP/Ed  Method of Education: [x] Verbal  [x] Demo  [] Written  Comprehension of Education:  [x] Verbalizes understanding. [x] Demonstrates understanding. [] Needs review. [] Demonstrates/verbalizes HEP/Ed previously given. Plan: [x] Continue per plan of care.    [] Other:      Treatment Charges: Mins Units   []  Modalities     [x]  Ther Exercise 40 3   []  Manual Therapy     []  Ther Activities     []  Aquatics     []  Neuromuscular     [] Vasocompression     [] Gait Training     [] Dry needling        [] 1 or 2 muscles        [] 3 or more muscles     []  Other     Total Treatment time 40 3     Time In: 1336            Time Out: 6501    Electronically signed by:  Lillie Strattonelor, PTA

## 2021-05-26 ENCOUNTER — HOSPITAL ENCOUNTER (OUTPATIENT)
Dept: PHYSICAL THERAPY | Age: 56
Setting detail: THERAPIES SERIES
Discharge: HOME | End: 2021-05-26
Payer: COMMERCIAL

## 2021-05-26 PROCEDURE — 97140 MANUAL THERAPY 1/> REGIONS: CPT

## 2021-05-26 PROCEDURE — 97110 THERAPEUTIC EXERCISES: CPT

## 2021-06-02 ENCOUNTER — HOSPITAL ENCOUNTER (OUTPATIENT)
Dept: PHYSICAL THERAPY | Age: 56
Setting detail: THERAPIES SERIES
Discharge: HOME | End: 2021-06-02
Payer: COMMERCIAL

## 2021-06-02 PROCEDURE — 97110 THERAPEUTIC EXERCISES: CPT

## 2021-06-02 NOTE — FLOWSHEET NOTE
[address] [facility]   [address] [facility] [address]   Phone: [phone]   Fax: [phone]    Physical Therapy Daily Treatment Note    Date:  2021  Patient Name:  Vicente Angel    :  1965  MRN: [id number]  Physician: Luz Foster DPM                 Insurance: [facility] 30 visits per year  Medical Diagnosis: retrocalcaneal exotosis M89.8X7, (R) heel bursitis M77.51               Rehab Codes: ankle pain M25.571  Onset date: referral 21                     Next Dr's appt.: PRN  Visit Count:                            Cancel/No Show: 0/0     Subjective:  Pt reports denies pain upon arrival today. States he felt increased pain two days ago without a mechanism of injury. States dog brushed up against his right heel about 4 days ago and cause sharp stbbing pain but went away after a few seconds. Pain:  [] Yes  [x] No Location: Right ankle/heel Pain Rating: (0-10 scale) 0/10  Pain altered Tx:  [x] No  [] Yes  Action:  Comments:    Objective:  Modalities:   Precautions:  Exercises:  Exercise Reps/ Time Weight/ Level Comments Preformed today   Supine Achilles stretch 3 x 30\"    x   Stair stretch 3 x 20\"    x   4-way ankle Tband 2x10 reps Blue R only x   Heel raises 20x  eccentric x   2 up 1 down 20x   x   SLS blue foam 3x30\"  R only x   BAPS board RLE 10x  L2  PF/DF; M/L; Cw/ccw x   Step downs Forward 10x2 R on step  x   Total gym heel raises 2x10 reps L10  x   Half sphere lunge 2x10 reps     x                     Other:    Specific Instructions for next treatment:  Progress standing exercises when able and continue POC    Assessment: [x] Progressing toward goals. Tolerated all exercises well with no increased pain noted. Notes increased fatigue with 2 up 1 down and heel raises on TG. Increased resistance and reps throughout treatment. [] No change.      [] Other:    [] Patient would continue to benefit from skilled physical therapy services in order to:     STG/LTG: (to be met in 6-8 treatments)  1. ? Pain: normalized pain  2. ? ROM: normalized DF- 10 degrees without pain  3. ? Strength:  Able to do (B) heel raise, able to do SL heel raise without pain  4. ? Function:  Return to WB after sitting and in the AM with 80% or greater improvement of symptoms. 5. Independent with Home Exercise Programs  Patient goals: to be able to walk better    Pt. Education:  [x] Yes  [] No  [x] Reviewed Prior HEP/Ed  Method of Education: [x] Verbal  [x] Demo  [] Written  Comprehension of Education:  [x] Verbalizes understanding. [x] Demonstrates understanding. [] Needs review. [] Demonstrates/verbalizes HEP/Ed previously given. Plan: [x] Continue per plan of care.    [] Other:      Treatment Charges: Mins Units   []  Modalities     [x]  Ther Exercise 35 2   []  Manual Therapy     []  Ther Activities     []  Aquatics     []  Neuromuscular     [] Vasocompression     [] Gait Training     [] Dry needling        [] 1 or 2 muscles        [] 3 or more muscles     []  Other     Total Treatment time 35 2     Time In: 1300            Time Out: 1036    Electronically signed by:  Burton Frye PTA

## 2021-06-03 ENCOUNTER — HOSPITAL ENCOUNTER (OUTPATIENT)
Dept: PHYSICAL THERAPY | Age: 56
Setting detail: THERAPIES SERIES
Discharge: HOME | End: 2021-06-03
Payer: COMMERCIAL

## 2021-06-03 PROCEDURE — 97110 THERAPEUTIC EXERCISES: CPT

## 2021-06-03 NOTE — FLOWSHEET NOTE
[address] [facility]   [address] [facility] [address]   Phone: [phone]   Fax: [phone]    Physical Therapy Daily Treatment Note    Date:  6/3/2021  Patient Name:  Khadijah Barclay    :  1965  MRN: [id number]  Physician: Veronica Klein DPM                 Insurance: [facility] 30 visits per year  Medical Diagnosis: retrocalcaneal exotosis M89.8X7, (R) heel bursitis M77.51               Rehab Codes: ankle pain M25.571  Onset date: referral 21                     Next Dr's appt.: PRN  Visit Count:                            Cancel/No Show: 0/0    Subjective:  Pt states that amb and descending stairs have gotten easier for him with less UE support needed when descending stairs. Pain:  [] Yes  [x] No Location: Right ankle/heel Pain Rating: (0-10 scale) 1/10  Pain altered Tx:  [x] No  [] Yes  Action:  Comments:    Objective:  Modalities:   Precautions:  Exercises:  Exercise Reps/ Time Weight/ Level Comments Preformed today   Supine Achilles stretch 3 x 30\"    x   Stair stretch 3 x 20\"    x   4-way ankle Tband 2x10 reps Blue R only x   Heel raises 20x  eccentric    2 up 1 down 20x2  6/3 inc sets x   SLS blue foam 3x30\"  R only x   BAPS board RLE 10x  L2  PF/DF; M/L; Cw/ccw; 6/3 performed in seated  x   Step downs Forward 10x2 R on step; 8\"  x   Total gym heel raises 2x10 reps L10  x   Half sphere lunge 2x10 reps  green  Fwd added lateral 6/3 x   Step taps fwd 20x 4\" Added 6/3 x              Other:    Specific Instructions for next treatment:  Progress standing exercises when able and continue POC    Assessment: [x] Progressing toward goals. Tolerated all exercises well with no increased pain noted. Notes increased fatigue with 2 up 1 down and heel raises on TG. Increased resistance and reps throughout treatment. [] No change.      [] Other:    [] Patient would continue to benefit from skilled physical therapy services in order to:     6/3/2021: Soreness felt with step downs on 8\" step this session. Some VC needed for proper execution of BAPs board exercises. Added step taps fwd and lateral lunges to improve strength/stability and activity tolerance in R ankle. Pt tolerated all exercises well this session without inc in pain or discomfort noted at the end of session. Pt states the ankle just feels fatigued. STG/LTG: (to be met in 6-8 treatments)  1. ? Pain: normalized pain  2. ? ROM: normalized DF- 10 degrees without pain  3. ? Strength:  Able to do (B) heel raise, able to do SL heel raise without pain  4. ? Function:  Return to WB after sitting and in the AM with 80% or greater improvement of symptoms. 5. Independent with Home Exercise Programs  Patient goals: to be able to walk better    Pt. Education:  [x] Yes  [] No  [x] Reviewed Prior HEP/Ed  Method of Education: [x] Verbal  [x] Demo  [] Written  Comprehension of Education:  [x] Verbalizes understanding. [x] Demonstrates understanding. [] Needs review. [] Demonstrates/verbalizes HEP/Ed previously given. Plan: [x] Continue per plan of care.    [] Other:      Treatment Charges: Mins Units   []  Modalities     [x]  Ther Exercise 40 3   []  Manual Therapy     []  Ther Activities     []  Aquatics     []  Neuromuscular     [] Vasocompression     [] Gait Training     [] Dry needling        [] 1 or 2 muscles        [] 3 or more muscles     []  Other     Total Treatment time 40 3     Time In: 9037            Time Out: 1150    Electronically signed by:  Cathleen Fegn PTA

## 2021-06-07 ENCOUNTER — HOSPITAL ENCOUNTER (OUTPATIENT)
Dept: PHYSICAL THERAPY | Age: 56
Setting detail: THERAPIES SERIES
End: 2021-06-07
Payer: COMMERCIAL

## 2021-06-10 ENCOUNTER — APPOINTMENT (OUTPATIENT)
Dept: PHYSICAL THERAPY | Age: 56
End: 2021-06-10
Payer: COMMERCIAL

## 2021-06-16 ENCOUNTER — TELEPHONE (OUTPATIENT)
Dept: UROLOGY | Age: 56
End: 2021-06-16

## 2021-06-17 ENCOUNTER — OFFICE VISIT (OUTPATIENT)
Dept: PODIATRY | Age: 56
End: 2021-06-17
Payer: COMMERCIAL

## 2021-06-17 VITALS — BODY MASS INDEX: 32.14 KG/M2 | HEIGHT: 69 IN | WEIGHT: 217 LBS

## 2021-06-17 DIAGNOSIS — R60.0 EDEMA OF LOWER EXTREMITY: ICD-10-CM

## 2021-06-17 DIAGNOSIS — M77.51 CALCANEAL BURSITIS (HEEL), RIGHT: ICD-10-CM

## 2021-06-17 DIAGNOSIS — M21.861 GASTROCNEMIUS EQUINUS OF RIGHT LOWER EXTREMITY: ICD-10-CM

## 2021-06-17 DIAGNOSIS — M79.671 PAIN IN RIGHT FOOT: ICD-10-CM

## 2021-06-17 DIAGNOSIS — M76.61 ACHILLES TENDINITIS OF RIGHT LOWER EXTREMITY: ICD-10-CM

## 2021-06-17 DIAGNOSIS — M89.8X7 RETROCALCANEAL EXOSTOSIS: Primary | ICD-10-CM

## 2021-06-17 PROCEDURE — 1036F TOBACCO NON-USER: CPT | Performed by: PODIATRIST

## 2021-06-17 PROCEDURE — 3017F COLORECTAL CA SCREEN DOC REV: CPT | Performed by: PODIATRIST

## 2021-06-17 PROCEDURE — 99213 OFFICE O/P EST LOW 20 MIN: CPT | Performed by: PODIATRIST

## 2021-06-17 PROCEDURE — G8427 DOCREV CUR MEDS BY ELIG CLIN: HCPCS | Performed by: PODIATRIST

## 2021-06-17 PROCEDURE — G8417 CALC BMI ABV UP PARAM F/U: HCPCS | Performed by: PODIATRIST

## 2021-06-17 RX ORDER — OMEPRAZOLE 20 MG/1
CAPSULE, DELAYED RELEASE ORAL
COMMUNITY
Start: 2021-06-11

## 2021-06-17 RX ORDER — ATORVASTATIN CALCIUM 40 MG/1
TABLET, FILM COATED ORAL
COMMUNITY
Start: 2021-06-14

## 2021-06-17 RX ORDER — LISINOPRIL 20 MG/1
TABLET ORAL
COMMUNITY
Start: 2021-06-14

## 2021-06-17 ASSESSMENT — ENCOUNTER SYMPTOMS
BACK PAIN: 1
SHORTNESS OF BREATH: 0
NAUSEA: 0
DIARRHEA: 0
COLOR CHANGE: 0

## 2021-06-17 NOTE — PROGRESS NOTES
[address] [facility]  Return Patient Progress Note    Subjective: Carmen Finley 60 y.o. male that presents for follow up evaluation of Achilles tendonitis and equinus to the E. Chief Complaint   Patient presents with    Follow-up     Right foot 6wk f/u - Per pt right foot is much better since begining physical therapy & has only slight soreness    Foot Pain     Right foot     Patient's treatment thus far has included physical therapy. Pain is rated 2 out of 10 and is described as intermittent. Patient has been following my prescribed course of therapy as instructed. Review of Systems   Constitutional: Negative for activity change, appetite change, chills, diaphoresis, fatigue and fever. Respiratory: Negative for shortness of breath. Cardiovascular: Negative for leg swelling. Gastrointestinal: Negative for diarrhea and nausea. Endocrine: Negative for cold intolerance, heat intolerance and polyuria. Musculoskeletal: Positive for arthralgias and back pain. Negative for gait problem, joint swelling and myalgias. Skin: Negative for color change, pallor, rash and wound. Allergic/Immunologic: Negative for environmental allergies and food allergies. Neurological: Negative for dizziness, weakness, light-headedness and numbness. Hematological: Does not bruise/bleed easily. Psychiatric/Behavioral: Negative for behavioral problems, confusion and self-injury. The patient is not nervous/anxious. Objective: Clinical evaluation of the patient reveals only mild pain with palpation to the posterior aspect of the right heel at the insertion of the Achilles tendon. There is no pain with palpation to the watershed region of the Achilles tendon. There is soft tissue resistance upon passive dorsiflexion of the bilateral ankles with the knee(s) extended. However, this soft tissue resistance is not present with the knee(s) flexed. This finding is greater on the right.  This resistance has decreased significantly since last visit. Assessment:    Diagnosis Orders   1. Retrocalcaneal exostosis  Ambulatory referral to Physical Therapy   2. Calcaneal bursitis (heel), right  Ambulatory referral to Physical Therapy   3. Achilles tendinitis of right lower extremity  Ambulatory referral to Physical Therapy   4. Gastrocnemius equinus of right lower extremity  Ambulatory referral to Physical Therapy   5. Edema of lower extremity  Ambulatory referral to Physical Therapy   6. Pain in right foot  Ambulatory referral to Physical Therapy         Plan: 1. Clinical evaluation of the patient. 2. Patient advised to continue with physical therapy. 3. Return in about 4 weeks (around 7/15/2021) for evaluation of natanael right.    6/17/2021      Jennifer Yadav, FADI

## 2021-06-28 NOTE — PROGRESS NOTES
[] [facility]) @ [facility]  [address] [facility] [address]  [place], [address] [address]  Phone [phone]  Fax [phone]    Physical Therapy Discharge Note    Date: 2021      Patient: Sandrine Gil  : 1965  MRN: [id number]    RCSUUEYXP: GLADIS Rick DPROLANDA                 [address] 30 visits per year  Medical Diagnosis: retrocalcaneal exotosis M89.8X7, (R) heel bursitis M77.51               Rehab Codes: ankle pain M25.571  Onset date: referral 21                     Next 's appt.: PRN  Visit Count:                            Cancel/No Show: 0/0  Date of initial visit:                    [] Patient recovered from conditions. Treatment goals were met. [] Patient received maximum benefit. No further therapy indicated at this time. [] Patient demonstrated improvement from condition with  ** Of  ** Short term goals met. []Patient demonstrated improvement from condition with **   Of **  Long term goals met. [x] Patient to continue exercise/home instructions independently. [x] Therapy interrupted due to: Cancelled 21 stated he was doing much better as far as pain. Fully (I) with HEP at this time, calf stretching program, Achilles remodeling program with strengthening exericises. Still pain with prolonged walking but less antalgic gait with ambulation and better tolerance of stairs grossly. Good prognosis for full function secondary to progress thus far and pending compliance to HEP. [] Patient has 2 or more no shows/cancels, is discontinued per our policy. [] Patient has completed prescribed number of treatment sessions. [] Other:      Pain level at evaluation was       /10 and at discharge was       /10    It Is My Understanding That The:  [] Patient returned to work. [] Patient demonstrated improved level of function. [] Patient returned to previous functional level.   [] Patient's current functional status is unknown due to no-shows  [] Other:     Recommendations/Comments:   DC to HEP. Treatment Included:     [x] Therapeutic Exercise   96397  [] Iontophoresis: 4 mg/mL Dexamethasone Sodium Phosphate  mAmin  45824   [] Therapeutic Activity  98061 [] Vasopneumatic cold with compression  56826    [] Gait Training   15518 [] Ultrasound   77532   [x] Neuromuscular Re-education  14448 [] Electrical Stimulation Unattended  70922   [x] Manual Therapy  03545 [] Electrical Stimulation Attended  40462   [x] Instruction in HEP  [] Lumbar/Cervical Traction  69717   [] Aquatic Therapy   52776 [] Cold/hotpack    [] Massage   38409      [] Dry Needling, 1 or 2 muscles  90911   [] Biofeedback, first 15 minutes   77761  [] Biofeedback, additional 15 minutes   17621 [] Dry Needling, 3 or more muscles  55401                If you have any questions or concerns regarding this patient's care, please contact us.    Thank you for your referral.      Electronically signed by: Thierry Maravilla PT

## 2021-06-28 NOTE — FLOWSHEET NOTE
[address] [facility] Outpatient Physical Therapy   [phone] [facility] [address]   Phone: [phone]   Fax: [phone]    Physical Therapy Daily Treatment Note    Date:  2021  Patient Name:  Kasie Zelaya    :  1965  MRN: [id number]  Physician: Saniya Feng DPROLANDA                 Insurance: [facility] 30 visits per year  Medical Diagnosis: retrocalcaneal exotosis M89.8X7, (R) heel bursitis M77.51               Rehab Codes: ankle pain M25.571  Onset date: referral 21                     Next Dr's appt.: PRN  Visit Count:                            Cancel/No Show: 0/0     Subjective:  Pt reports much better pain levels overall. Decreased antalgic gait, still pain with prolonged WB. Pain:  [x] Yes  [] No Location: Right ankle/heel Pain Rating: (0-10 scale) 4/10  Pain altered Tx:  [x] No  [] Yes  Action:  Comments:    Objective:  Modalities:   Precautions:  Exercises:  Exercise Reps/ Time Weight/ Level Comments Preformed today   Supine Achilles stretch 3 x 30\"    x   Stair stretch 3 x 20\"    x   4-way ankle Tband x10 reps Red R only x   Heel raises 20x  eccentric x   2 up 1 down 10x   x   SLS blue foam 3x30\"  R only x   BAPS board RLE 10x  L2  PF/DF; M/L; Cw/ccw x   Step downs Forward 10x2 R on step  x   Total gym heel raises       Half sphere            MANUAL-      posterior talar glide, PROM X   Other:    Specific Instructions for next treatment:  Progress standing exercises when able and continue POC    Assessment: [x] Progressing toward goals. Tolerated exercises well with progressing reps and exercises shown above. Notes fatigue with SL exercises     [] No change.      [] Other:    [] Patient would continue to benefit from skilled physical therapy services in order to:     STG/LTG: (to be met in 6-8 treatments)  1. ? Pain: normalized pain  2. ? ROM: normalized DF- 10 degrees without pain  3. ? Strength:  Able to do (B) heel raise, able to do SL heel raise without

## 2021-07-02 ENCOUNTER — OFFICE VISIT (OUTPATIENT)
Dept: UROLOGY | Age: 56
End: 2021-07-02
Payer: COMMERCIAL

## 2021-07-02 VITALS
SYSTOLIC BLOOD PRESSURE: 135 MMHG | WEIGHT: 217 LBS | TEMPERATURE: 97.1 F | BODY MASS INDEX: 32.14 KG/M2 | HEART RATE: 72 BPM | HEIGHT: 69 IN | RESPIRATION RATE: 18 BRPM | DIASTOLIC BLOOD PRESSURE: 80 MMHG

## 2021-07-02 DIAGNOSIS — R97.20 ELEVATED PSA: ICD-10-CM

## 2021-07-02 DIAGNOSIS — R35.1 NOCTURIA: Primary | ICD-10-CM

## 2021-07-02 PROCEDURE — 99213 OFFICE O/P EST LOW 20 MIN: CPT | Performed by: UROLOGY

## 2021-07-02 PROCEDURE — 3017F COLORECTAL CA SCREEN DOC REV: CPT | Performed by: UROLOGY

## 2021-07-02 PROCEDURE — 1036F TOBACCO NON-USER: CPT | Performed by: UROLOGY

## 2021-07-02 PROCEDURE — G8427 DOCREV CUR MEDS BY ELIG CLIN: HCPCS | Performed by: UROLOGY

## 2021-07-02 PROCEDURE — G8417 CALC BMI ABV UP PARAM F/U: HCPCS | Performed by: UROLOGY

## 2021-07-02 ASSESSMENT — ENCOUNTER SYMPTOMS
VOMITING: 0
CONSTIPATION: 0
DIARRHEA: 0
ABDOMINAL PAIN: 0
COUGH: 0
BACK PAIN: 0
EYE PAIN: 0
SHORTNESS OF BREATH: 0
WHEEZING: 0
NAUSEA: 0

## 2021-07-02 NOTE — PROGRESS NOTES
[address] [address]  Dept: [address] [facility] Office Note - Established    Patient:  Virgle Mortimer  YOB: 1965  Date: 7/2/2021    The patient is a 60 y.o. male who presents todayfor evaluation of the following problems:   Chief Complaint   Patient presents with    Elevated PSA     wants to talk about prostate bx is needed at this time       HPI  He is here in follow up for elevated PSA. He had a PSA in January, which was 5.42. Repeat in April was a bit lower at 5.17. He is voiding well. He has some nocturia. He has no FHx of prostate cancer. Summary of old records: N/A    Additional History: N/A    Procedures Today: N/A    Urinalysis today:  No results found for this visit on 07/02/21. Last several PSA's:  No results found for: PSA  Last total testosterone:  No results found for: TESTOSTERONE    AUA Symptom Score (7/2/2021):   INCOMPLETE EMPTYING: How often have you had the sensation of not emptying your bladder?: Not at all  FREQUENCY: How often do you have to urinate less than every two hours?: Not at all  INTERMITTENCY: How often have you found you stopped and started again several times when you urinated?: Not at all  URGENCY: How often have you found it difficult to postpone urination?: Not at all  WEAK STREAM: How often have you had a weak urinary stream?: Not at all  STRAINING: How often have you had to strain to start  urination?: Not at all  NOCTURIA: How many times did you typically get up at night to uriniate?: NONE  TOTAL I-PSS SCORE[de-identified] 0       Last BUN and creatinine:  Lab Results   Component Value Date    BUN 16 06/30/2015     Lab Results   Component Value Date    CREATININE 1.10 06/30/2015       Additional Lab/Culture results: none    Imaging Reviewed during this Office Visit: none  (results were independently reviewed by physician and radiology report verified)    PAST MEDICAL, FAMILY AND SOCIAL HISTORY UPDATE:  Past Medical History:   Diagnosis Date    Cancer ([facility] [address]     Diverticulosis of colon     GERD (gastroesophageal reflux disease)     Hypertension      Past Surgical History:   Procedure Laterality Date    COLONOSCOPY  12/19/14    hyperplastic polyp, diverticulosis     ENDOSCOPY, COLON, DIAGNOSTIC      SKIN CANCER EXCISION      melanoma    UPPER GASTROINTESTINAL ENDOSCOPY  12/19/14    mild esophagitis     Family History   Problem Relation Age of Onset    High Blood Pressure Mother     Diabetes Father     Diabetes Maternal Uncle     Diabetes Maternal Grandmother     Diabetes Maternal Grandfather      Outpatient Medications Marked as Taking for the 7/2/21 encounter (Office Visit) with Yohana Nathan MD   Medication Sig Dispense Refill    lisinopril (PRINIVIL;ZESTRIL) 20 MG tablet       omeprazole (PRILOSEC) 20 MG delayed release capsule       atorvastatin (LIPITOR) 40 MG tablet       amitriptyline (ELAVIL) 10 MG tablet Take 10 mg by mouth nightly      ibuprofen (ADVIL;MOTRIN) 800 MG tablet Take 800 mg by mouth      hydrochlorothiazide (MICROZIDE) 12.5 MG capsule Take 12.5 mg by mouth daily. Patient has no known allergies. Social History     Tobacco Use   Smoking Status Former Smoker    Packs/day: 0.00    Years: 30.00    Pack years: 0.00    Quit date: 1/28/2018    Years since quitting: 3.4   Smokeless Tobacco Never Used     (Ifpatient a smoker, smoking cessation counseling offered)    Social History     Substance and Sexual Activity   Alcohol Use No       REVIEW OF SYSTEMS:  Review of Systems    Physical Exam:      Vitals:    07/02/21 0823   BP: 135/80   Pulse: 72   Resp: 18   Temp: 97.1 °F (36.2 °C)     Body mass index is 32.05 kg/m². Patient is a 60 y.o. male in no acute distress and alert and oriented to person, place and time. Physical Exam  Constitutional: Patient in no acute distress.   Neuro: Alert and oriented to person, place and time. Psych: Mood normal, affect normal  Lungs: Respiratory effort is normal  Cardiovascular: Warm & Pink  Abdomen: Soft, non-tender, non-distended with no CVA,  No flank tenderness,  Or hepatosplenomegaly   Lymphatics: No palpablelymphadenopathy. Bladder non-tender and not distended. Musculoskeletal: Normal gait and station      Assessment and Plan      1. Nocturia    2. Elevated PSA           Plan:          He has an elevated PSA. We discussed his options. He is a bit reluctant to have a biopsy at this time. His PSA did trend down. Will repeat PSA in October. Return in about 3 months (around 10/2/2021) for Labs. Prescriptions Ordered:  No orders of the defined types were placed in this encounter. Orders Placed:  Orders Placed This Encounter   Procedures    PSA, Diagnostic     Standing Status:   Future     Standing Expiration Date:   7/2/2022           Prem Rapp MD    Agree with the ROS entered by the MA.

## 2021-07-02 NOTE — PROGRESS NOTES
Review of Systems   Constitutional: Negative for appetite change, chills, fatigue and fever. Eyes: Negative for pain and visual disturbance. Respiratory: Negative for cough, shortness of breath and wheezing. Cardiovascular: Negative for chest pain and leg swelling. Gastrointestinal: Negative for abdominal pain, constipation, diarrhea, nausea and vomiting. Genitourinary: Negative for difficulty urinating, dysuria, frequency, hematuria, penile pain and testicular pain. Musculoskeletal: Negative for back pain and myalgias. Neurological: Negative for dizziness, tremors, weakness, light-headedness, numbness and headaches. Hematological: Negative for adenopathy. Does not bruise/bleed easily.    none

## 2021-07-02 NOTE — LETTER
[address] [address]  Dept: [phone]  Dept Fax: [phone]        7/2/21    Patient: Jerry Raymundo  YOB: 1965    Dear APRIL Ash CNP,    I had the pleasure of seeing one of your patients, Venkata Yaz today in the office today. Below are the relevant portions of my assessment and plan of care. IMPRESSION:  1. Nocturia    2. Elevated PSA        PLAN:  He has an elevated PSA. We discussed his options. He is a bit reluctant to have a biopsy at this time. His PSA did trend down. Will repeat PSA in October. Return in about 3 months (around 10/2/2021) for Labs. Prescriptions Ordered:  No orders of the defined types were placed in this encounter. Orders Placed:  Orders Placed This Encounter   Procedures    PSA, Diagnostic     Standing Status:   Future     Standing Expiration Date:   7/2/2022        Thank you for allowing me to participate in the care of this patient. I will keep you updated on this patient's follow up and I look forward to serving you and your patients again in the future.         Christina Hanson MD

## 2021-07-07 ENCOUNTER — HOSPITAL ENCOUNTER (OUTPATIENT)
Dept: PHYSICAL THERAPY | Age: 56
Setting detail: THERAPIES SERIES
Discharge: HOME | End: 2021-07-07
Payer: COMMERCIAL

## 2021-07-07 NOTE — FLOWSHEET NOTE
[x] [facility]) [facility] & Therapy  [address] [facility]  [place]: [phone]   F: [phone]     Physical Therapy Cancel/No Show note    Date: 2021  Patient: Shivani Martínez  : 1965  MRN: [id number]      Cancels/No Shows to date:1    For today's appointment patient:    [x]  Cancelled    [] Rescheduled appointment    [] No-show     Reason given by patient:    []  Patient ill    []  Conflicting appointment    [] No transportation      [] Conflict with work    [] No reason given    [] Weather related    [] COVID-19    [x] Other:      Comments:  Has poison ivy and cannot come into clinic      [] Next appointment was confirmed    Electronically signed by: Cyndy Isaac PTA

## 2021-07-14 ENCOUNTER — HOSPITAL ENCOUNTER (OUTPATIENT)
Dept: PHYSICAL THERAPY | Age: 56
Setting detail: THERAPIES SERIES
Discharge: HOME | End: 2021-07-14
Payer: COMMERCIAL

## 2021-07-14 NOTE — FLOWSHEET NOTE
[x] [facility]) [facility] & Therapy  [address] [facility] [address]  [place]: [phone]   F: [phone]     Physical Therapy Cancel/No Show note    Date: 2021  Patient: Christal Navarrete  : 1965  MRN: [id number]      Cancels/No Shows to date:     For today's appointment patient:    [x]  Cancelled     [] Rescheduled appointment    [] No-show     Reason given by patient:    []  Patient ill    []  Conflicting appointment    [] No transportation      [] Conflict with work    [] No reason given    [] Weather related    [] COVID-19    [x] Other:      Comments:  Called pt and talked to wife. Pt was diagnosed with brain tumor on 21 and is scheduled to have surgery 21. Pt will be discharge from therapy due to recent events.        [] Next appointment was confirmed    Electronically signed by: Robyn Peralta PTA

## 2021-08-09 ENCOUNTER — HOSPITAL ENCOUNTER (OUTPATIENT)
Age: 56
Setting detail: SPECIMEN
Discharge: HOME | End: 2021-08-09
Payer: COMMERCIAL

## 2021-08-10 LAB
ANION GAP SERPL CALCULATED.3IONS-SCNC: 13 MMOL/L (ref 9–17)
BUN BLDV-MCNC: 21 MG/DL (ref 6–20)
BUN/CREAT BLD: ABNORMAL (ref 9–20)
CALCIUM SERPL-MCNC: 8.5 MG/DL (ref 8.6–10.4)
CHLORIDE BLD-SCNC: 101 MMOL/L (ref 98–107)
CO2: 24 MMOL/L (ref 20–31)
CREAT SERPL-MCNC: 0.86 MG/DL (ref 0.7–1.2)
GFR AFRICAN AMERICAN: >60 ML/MIN
GFR NON-AFRICAN AMERICAN: >60 ML/MIN
GFR SERPL CREATININE-BSD FRML MDRD: ABNORMAL ML/MIN/{1.73_M2}
GFR SERPL CREATININE-BSD FRML MDRD: ABNORMAL ML/MIN/{1.73_M2}
GLUCOSE BLD-MCNC: 198 MG/DL (ref 70–99)
POTASSIUM SERPL-SCNC: 4.7 MMOL/L (ref 3.7–5.3)
SODIUM BLD-SCNC: 138 MMOL/L (ref 135–144)

## 2021-09-08 NOTE — TELEPHONE ENCOUNTER
From 7/2/21 office visit-  He has an elevated PSA. We discussed his options. He is a bit reluctant to have a biopsy at this time. His PSA did trend down. Will repeat PSA in October. Return in about 3 months (around 10/2/2021) for Labs.